# Patient Record
Sex: MALE | Race: OTHER | NOT HISPANIC OR LATINO | URBAN - METROPOLITAN AREA
[De-identification: names, ages, dates, MRNs, and addresses within clinical notes are randomized per-mention and may not be internally consistent; named-entity substitution may affect disease eponyms.]

---

## 2017-12-13 ENCOUNTER — EMERGENCY (EMERGENCY)
Facility: HOSPITAL | Age: 52
LOS: 1 days | Discharge: HOME | End: 2017-12-13
Attending: EMERGENCY MEDICINE | Admitting: EMERGENCY MEDICINE
Payer: MEDICAID

## 2017-12-13 VITALS
RESPIRATION RATE: 16 BRPM | HEART RATE: 78 BPM | TEMPERATURE: 99 F | OXYGEN SATURATION: 99 % | DIASTOLIC BLOOD PRESSURE: 98 MMHG | SYSTOLIC BLOOD PRESSURE: 158 MMHG

## 2017-12-13 VITALS
HEART RATE: 72 BPM | TEMPERATURE: 99 F | DIASTOLIC BLOOD PRESSURE: 98 MMHG | SYSTOLIC BLOOD PRESSURE: 166 MMHG | OXYGEN SATURATION: 99 % | RESPIRATION RATE: 17 BRPM

## 2017-12-13 PROCEDURE — 99283 EMERGENCY DEPT VISIT LOW MDM: CPT

## 2017-12-13 RX ORDER — IBUPROFEN 200 MG
600 TABLET ORAL ONCE
Qty: 0 | Refills: 0 | Status: DISCONTINUED | OUTPATIENT
Start: 2017-12-13 | End: 2017-12-13

## 2017-12-13 RX ORDER — OXYCODONE HYDROCHLORIDE 5 MG/1
1 TABLET ORAL
Qty: 10 | Refills: 0 | OUTPATIENT
Start: 2017-12-13

## 2017-12-13 RX ORDER — OXYCODONE AND ACETAMINOPHEN 5; 325 MG/1; MG/1
1 TABLET ORAL ONCE
Qty: 0 | Refills: 0 | Status: DISCONTINUED | OUTPATIENT
Start: 2017-12-13 | End: 2017-12-13

## 2017-12-13 RX ORDER — IBUPROFEN 200 MG
1 TABLET ORAL
Qty: 15 | Refills: 0 | OUTPATIENT
Start: 2017-12-13

## 2017-12-13 RX ADMIN — OXYCODONE AND ACETAMINOPHEN 1 TABLET(S): 5; 325 TABLET ORAL at 17:44

## 2017-12-13 RX ADMIN — OXYCODONE AND ACETAMINOPHEN 1 TABLET(S): 5; 325 TABLET ORAL at 18:14

## 2017-12-13 NOTE — ED PROVIDER NOTE - CARE PLAN
Principal Discharge DX:	Tooth pain  Instructions for follow-up, activity and diet:	Follow up with your Dentist or call the clinic to schedule an appointment [phone].  Soft diet eat on opposite side of mouth.  Take Motrin 600mg every 8 hours as needed for pain take with food.  Percocet 5/325mg  1 tablet every 6 hrs as needed for severe pain. Do not drive or drink alcohol while taking this medications.  Return to the ER for any persistent/worsening or new symptoms swelling, fevers, chills or any concerning symptoms.

## 2017-12-13 NOTE — ED PROVIDER NOTE - OBJECTIVE STATEMENT
51 y/o M w/ no significant PMHx, presents to the ED c/o left upper tooth pain x6 hrs. Pt states the tooth has been broken, but has just started to hurt today. Endorses use of 3 Tylenol w/ no relief. Pt notes he called his dentist, but was unable to see him today. Denies fever, chills or any other complaints. NKDA. 53 y/o M w/ no significant PMHx, presents to the ED c/o left upper tooth pain x6 hrs. Pt states the tooth has been broken, but has just started to hurt today. Pt notes he applied a topical medication from [facility] onto the tooth w/ some relief, but the pain returned shortly. Pt also endorses use of 3 Tylenol w/ no relief. Pt called his dentist, but was unable to see him today. Denies fever, chills or any other complaints. NKDA.

## 2017-12-13 NOTE — ED PROVIDER NOTE - PROGRESS NOTE DETAILS
EDSON Garcia:(QUID PA) pt ambulating without difficulty, pt still reports some dental pain, pt declined Motrin in the ER states he wants to go home and eat prior to taking medication.  Discharge reviewed and discussed with patient.  Pt was seen and evaluated by dental consult in chart. As per dental:  Gross decay to pulp seen radiographically on teeth #12 and #13. Overretained root tip #14.

## 2017-12-13 NOTE — ED PROVIDER NOTE - PLAN OF CARE
Follow up with your Dentist or call the clinic to schedule an appointment [phone].  Soft diet eat on opposite side of mouth.  Take Motrin 600mg every 8 hours as needed for pain take with food.  Percocet 5/325mg  1 tablet every 6 hrs as needed for severe pain. Do not drive or drink alcohol while taking this medications.  Return to the ER for any persistent/worsening or new symptoms swelling, fevers, chills or any concerning symptoms.

## 2017-12-13 NOTE — ED PROVIDER NOTE - TOOTH FINDINGS
broken tooth #2 w/ no signs of infection, positive ttp over gum and tooth no swelling, no erythema, no discharge, no signs of infection, positive ttp over gum and teeth #12, 13, 14

## 2017-12-13 NOTE — ED PROVIDER NOTE - MEDICAL DECISION MAKING DETAILS
53 y/o M here w/ dental pain secondary to caries and avulsed tooth. Will call dental, give pain control and reassess.

## 2017-12-13 NOTE — ED ADULT TRIAGE NOTE - CHIEF COMPLAINT QUOTE
Pt c/o upper left tooth pain x 5 hrs, states dentist office is closed. took 1500 mg of tylenol without relief, denies any fevers or chills, bleeding.

## 2017-12-13 NOTE — PROGRESS NOTE ADULT - SUBJECTIVE AND OBJECTIVE BOX
Patient is a 52y old  Male who presents with a chief complaint of  tooth pain.    HPI:  51 y/o M w/ no significant PMHx, presents to the ED c/o left upper tooth pain x6 hrs. Pt states the tooth has been broken, but has just started to hurt today. Pt notes he applied a topical medication from [facility] onto the tooth w/ some relief, but the pain returned shortly. Pt also endorses use of 3 Tylenol w/ no relief. Pt called his dentist, but was unable to see him today. Denies fever, chills or any other complaints. NKDA.    PAST MEDICAL & SURGICAL HISTORY:No pertinent past medical history.No significant past surgical history    MEDICATIONS  (STANDING):    MEDICATIONS  (PRN):      Allergies    No Known Allergies    Intolerances      Vital Signs Last 24 Hrs  T(C): 37.2 (13 Dec 2017 18:06), Max: 37.2 (13 Dec 2017 18:06)  T(F): 98.9 (13 Dec 2017 18:06), Max: 98.9 (13 Dec 2017 18:06)  HR: 72 (13 Dec 2017 18:06) (72 - 78)  BP: 166/98 (13 Dec 2017 18:06) (158/98 - 166/98)  BP(mean): --  RR: 17 (13 Dec 2017 18:06) (16 - 17)  SpO2: 99% (13 Dec 2017 18:06) (99% - 99%)    EOE:  TMJ (  - ) clicks                    (  -  ) pops                    (  -  ) crepitus             Mandible FROM             Facial bones and MOM grossly intact             (  - ) trismus             (  - ) LAD             (  - ) swelling             (  - ) asymmetry             (  - ) palpation             (  - ) SOB             (  - ) dysphagia         IOE:  permanent dentition: multiple carious teeth            hard/soft palate:  ( -  ) palatal torus           tongue/FOM WNL           labial/buccal mucosa WNL           ( + ) percussion, teeth #12,13,14            ( +) palpation, teeth #12,13,14           ( - ) swelling   Tooth #14 is a root tip       RADIOLOGY & ADDITIONAL STUDIES: Periapical radiograph of teeth #12,13,14 reveals overretained root tip #14 and gross decay to pulp on teeth #12 and 13.     ASSESSMENT: 52 year old male presents with tooth pain associated with teeth #12,13,14. Gross decay to pulp seen radiographically on teeth #12 and #13. Overretained root tip #14. No swelling intraorally or extraorally.       RECOMMENDATIONS:   1) Pain management as per patient's med team   2) Dental F/U with outpatient dentist for comprehensive dental care.   3) If any difficulty swallowing/breathing, fever occur, page dental.     Ju Demarco DDS  Pager #[phone]